# Patient Record
Sex: FEMALE | Race: WHITE | NOT HISPANIC OR LATINO | Employment: OTHER | URBAN - METROPOLITAN AREA
[De-identification: names, ages, dates, MRNs, and addresses within clinical notes are randomized per-mention and may not be internally consistent; named-entity substitution may affect disease eponyms.]

---

## 2017-05-01 ENCOUNTER — PROCEDURE VISIT (OUTPATIENT)
Dept: OBSTETRICS AND GYNECOLOGY | Facility: CLINIC | Age: 57
End: 2017-05-01

## 2017-05-01 ENCOUNTER — OFFICE VISIT (OUTPATIENT)
Dept: OBSTETRICS AND GYNECOLOGY | Facility: CLINIC | Age: 57
End: 2017-05-01

## 2017-05-01 VITALS
DIASTOLIC BLOOD PRESSURE: 68 MMHG | BODY MASS INDEX: 32.38 KG/M2 | WEIGHT: 226.2 LBS | HEIGHT: 70 IN | SYSTOLIC BLOOD PRESSURE: 140 MMHG

## 2017-05-01 DIAGNOSIS — N81.9 FEMALE GENITAL PROLAPSE, UNSPECIFIED TYPE: ICD-10-CM

## 2017-05-01 DIAGNOSIS — N95.9 MENOPAUSAL AND PERIMENOPAUSAL DISORDER: ICD-10-CM

## 2017-05-01 DIAGNOSIS — N84.1 CERVICAL POLYP: ICD-10-CM

## 2017-05-01 DIAGNOSIS — N95.0 POSTMENOPAUSAL VAGINAL BLEEDING: Primary | ICD-10-CM

## 2017-05-01 DIAGNOSIS — Z01.411 ENCOUNTER FOR GYNECOLOGICAL EXAMINATION WITH ABNORMAL FINDING: Primary | ICD-10-CM

## 2017-05-01 LAB
DEVELOPER EXPIRATION DATE: NORMAL
DEVELOPER LOT NUMBER: NORMAL
EXPIRATION DATE: NORMAL
FECAL OCCULT BLOOD SCREEN, POC: NEGATIVE
Lab: NORMAL
NEGATIVE CONTROL: NEGATIVE
POSITIVE CONTROL: POSITIVE

## 2017-05-01 PROCEDURE — 57500 BIOPSY OF CERVIX: CPT | Performed by: OBSTETRICS & GYNECOLOGY

## 2017-05-01 PROCEDURE — 82270 OCCULT BLOOD FECES: CPT | Performed by: OBSTETRICS & GYNECOLOGY

## 2017-05-01 PROCEDURE — 76830 TRANSVAGINAL US NON-OB: CPT | Performed by: OBSTETRICS & GYNECOLOGY

## 2017-05-01 PROCEDURE — 99396 PREV VISIT EST AGE 40-64: CPT | Performed by: OBSTETRICS & GYNECOLOGY

## 2017-05-02 LAB
CONV .: NORMAL
CYTOLOGIST CVX/VAG CYTO: NORMAL
CYTOLOGY CVX/VAG DOC THIN PREP: NORMAL
DX ICD CODE: NORMAL
FSH SERPL-ACNC: 12.8 MIU/ML
HIV 1 & 2 AB SER-IMP: NORMAL
OTHER STN SPEC: NORMAL
PATH REPORT.FINAL DX SPEC: NORMAL
STAT OF ADQ CVX/VAG CYTO-IMP: NORMAL

## 2017-05-03 LAB
DX ICD CODE: NORMAL
DX ICD CODE: NORMAL
PATH REPORT.FINAL DX SPEC: NORMAL
PATH REPORT.GROSS SPEC: NORMAL
PATH REPORT.SITE OF ORIGIN SPEC: NORMAL
PATHOLOGIST NAME: NORMAL
PAYMENT PROCEDURE: NORMAL

## 2017-05-08 ENCOUNTER — TELEPHONE (OUTPATIENT)
Dept: OBSTETRICS AND GYNECOLOGY | Facility: CLINIC | Age: 57
End: 2017-05-08

## 2017-05-17 ENCOUNTER — OFFICE VISIT (OUTPATIENT)
Dept: OBSTETRICS AND GYNECOLOGY | Facility: CLINIC | Age: 57
End: 2017-05-17

## 2017-05-17 VITALS
DIASTOLIC BLOOD PRESSURE: 70 MMHG | SYSTOLIC BLOOD PRESSURE: 128 MMHG | BODY MASS INDEX: 32.64 KG/M2 | WEIGHT: 228 LBS | HEIGHT: 70 IN

## 2017-05-17 DIAGNOSIS — R93.89 THICKENED ENDOMETRIUM: Primary | ICD-10-CM

## 2017-05-17 PROCEDURE — 58100 BIOPSY OF UTERUS LINING: CPT | Performed by: OBSTETRICS & GYNECOLOGY

## 2017-05-22 ENCOUNTER — TELEPHONE (OUTPATIENT)
Dept: OBSTETRICS AND GYNECOLOGY | Facility: CLINIC | Age: 57
End: 2017-05-22

## 2017-05-23 ENCOUNTER — PATIENT MESSAGE (OUTPATIENT)
Dept: OBSTETRICS AND GYNECOLOGY | Facility: CLINIC | Age: 57
End: 2017-05-23

## 2017-06-28 ENCOUNTER — OFFICE VISIT (OUTPATIENT)
Dept: OBSTETRICS AND GYNECOLOGY | Facility: CLINIC | Age: 57
End: 2017-06-28

## 2017-06-28 VITALS
WEIGHT: 224 LBS | SYSTOLIC BLOOD PRESSURE: 120 MMHG | BODY MASS INDEX: 32.07 KG/M2 | HEIGHT: 70 IN | DIASTOLIC BLOOD PRESSURE: 70 MMHG

## 2017-06-28 DIAGNOSIS — N81.2 INCOMPLETE UTEROVAGINAL PROLAPSE: Primary | ICD-10-CM

## 2017-06-28 PROCEDURE — 99213 OFFICE O/P EST LOW 20 MIN: CPT | Performed by: OBSTETRICS & GYNECOLOGY

## 2017-06-28 RX ORDER — CYCLOBENZAPRINE HCL 5 MG
5 TABLET ORAL 3 TIMES DAILY PRN
COMMUNITY
Start: 2017-05-11

## 2017-06-28 RX ORDER — SODIUM CHLORIDE 0.9 % (FLUSH) 0.9 %
1-10 SYRINGE (ML) INJECTION AS NEEDED
Status: CANCELLED | OUTPATIENT
Start: 2017-08-15

## 2017-06-28 RX ORDER — NAPROXEN 500 MG/1
500 TABLET ORAL 2 TIMES DAILY WITH MEALS
COMMUNITY
Start: 2017-06-12 | End: 2020-09-17

## 2017-06-28 NOTE — PROGRESS NOTES
"Subjective    Chief Complaint   Patient presents with   • Consult     DISCUSS HYST.       History of Present Illness    Gaby Mays is a 56 y.o. female who presents for Evaluation for hysterectomy due to symptomatic uterine descensus.  Patient had a negative endometrial biopsy and Pap this year and an ultrasound showed normal ovaries with a 1.7 cm stripe.  Patient states her descensus is getting worse but does not have stress urinary incontinence or any other GI or  issues.  Long discussion about the risks and complications of total vaginal hysterectomy including GI and  injury, colostomy, general anesthesia and patient is fine with these risks.  She understands that a vaginal hysterectomy could be converted if necessary to an abdominal hysterectomy which would require an incision.  She is interested in having a hysterectomy performed but would like to retain her ovaries if possible as we are planning.    Obstetric History:  OB History      Para Term  AB TAB SAB Ectopic Multiple Living    2 2 2                Menstrual History:     No LMP recorded.       History reviewed. No pertinent past medical history.  History reviewed. No pertinent family history.    The following portions of the patient's history were reviewed and updated as appropriate: allergies, current medications, past family history, past medical history, past social history, past surgical history and problem list.    Review of Systems   Genitourinary: Positive for dyspareunia, pelvic pain and vaginal pain.            Objective   Physical Exam   Genitourinary:   Genitourinary Comments: Patient has second to third degree descensus with the cervix at the vaginal introitus.  She has a first-degree cystocele and no significant rectocele.  Rectovaginal exam and bimanual exam negative.       /70  Ht 70\" (177.8 cm)  Wt 224 lb (102 kg)  BMI 32.14 kg/m2    Assessment/Plan   Gaby was seen today for consult.    Diagnoses and all " orders for this visit:    Incomplete uterovaginal prolapse  -     sodium chloride 0.9 % flush 1-10 mL; Infuse 1-10 mL into a venous catheter As Needed for Line Care.  -     Case Request; Standing  -     Case Request    Other orders  -     Follow Anesthesia Guidelines / Standing Orders; Future  -     Follow Anesthesia Guidelines / Standing Orders; Standing  -     Obtain Informed Consent; Standing  -     Insert Peripheral IV; Standing  -     Saline Lock & Maintain IV Access; Standing        Scheduling TV at [facility].    20 minute visit of which 15 minutes was face-to-face counseling concerning the risks and pros and cons of vaginal hysterectomy.

## 2017-07-10 ENCOUNTER — PATIENT MESSAGE (OUTPATIENT)
Dept: OBSTETRICS AND GYNECOLOGY | Facility: CLINIC | Age: 57
End: 2017-07-10

## 2017-08-01 ENCOUNTER — APPOINTMENT (OUTPATIENT)
Dept: PREADMISSION TESTING | Facility: HOSPITAL | Age: 57
End: 2017-08-01

## 2017-08-01 VITALS
TEMPERATURE: 97.4 F | SYSTOLIC BLOOD PRESSURE: 113 MMHG | OXYGEN SATURATION: 98 % | BODY MASS INDEX: 32.07 KG/M2 | DIASTOLIC BLOOD PRESSURE: 69 MMHG | RESPIRATION RATE: 20 BRPM | WEIGHT: 224 LBS | HEIGHT: 70 IN | HEART RATE: 57 BPM

## 2017-08-01 LAB
DEPRECATED RDW RBC AUTO: 47.3 FL (ref 37–54)
ERYTHROCYTE [DISTWIDTH] IN BLOOD BY AUTOMATED COUNT: 13.6 % (ref 11.7–13)
HCT VFR BLD AUTO: 36.8 % (ref 35.6–45.5)
HGB BLD-MCNC: 12.2 G/DL (ref 11.9–15.5)
MCH RBC QN AUTO: 31.6 PG (ref 26.9–32)
MCHC RBC AUTO-ENTMCNC: 33.2 G/DL (ref 32.4–36.3)
MCV RBC AUTO: 95.3 FL (ref 80.5–98.2)
PLATELET # BLD AUTO: 297 10*3/MM3 (ref 140–500)
PMV BLD AUTO: 10 FL (ref 6–12)
RBC # BLD AUTO: 3.86 10*6/MM3 (ref 3.9–5.2)
WBC NRBC COR # BLD: 6.92 10*3/MM3 (ref 4.5–10.7)

## 2017-08-01 PROCEDURE — 85027 COMPLETE CBC AUTOMATED: CPT | Performed by: OBSTETRICS & GYNECOLOGY

## 2017-08-01 PROCEDURE — 36415 COLL VENOUS BLD VENIPUNCTURE: CPT

## 2017-08-01 RX ORDER — CHLORAL HYDRATE 500 MG
1000 CAPSULE ORAL
COMMUNITY

## 2017-08-01 NOTE — DISCHARGE INSTRUCTIONS
Take the following medications the morning of surgery with a small sip of water:  NONE      General Instructions:  • Do not eat solid food after midnight the night before surgery.  • You may drink clear liquids day of surgery but must stop at least one hour before your hospital arrival time.  • It is beneficial for you to have a clear drink that contains carbohydrates the day of surgery.  We suggest a 20 ounce bottle of Gatorade or Powerade for non-diabetic patients or a 20 ounce bottle of G2 or Powerade Zero for diabetic patients. (Pediatric patients, are not advised to drink a 20 ounce carbohydrate drink)    Clear liquids are liquids you can see through.  Nothing red in color.     Plain water                               Sports drinks  Sodas                                   Gelatin (Jell-O)  Fruit juices without pulp such as white grape juice and apple juice  Popsicles that contain no fruit or yogurt  Tea or coffee (no cream or milk added)  Gatorade / Powerade  G2 / Powerade Zero    • Infants may have breast milk up to four hours before surgery.  • Infants drinking formula may drink formula up to six hours before surgery.   • Patients who avoid smoking, chewing tobacco and alcohol for 4 weeks prior to surgery have a reduced risk of post-operative complications.  Quit smoking as many days before surgery as you can.  • Do not smoke, use chewing tobacco or drink alcohol the day of surgery.   • If applicable bring your C-PAP/ BI-PAP machine.  • Bring any papers given to you in the doctor’s office.  • Wear clean comfortable clothes and socks.  • Do not wear contact lenses or make-up.  Bring a case for your glasses.   • Bring crutches or walker if applicable.  • Leave all other valuables and jewelry at home.  • The Pre-Admission Testing nurse will instruct you to bring medications if unable to obtain an accurate list in Pre-Admission Testing.        If you were given a blood bank ID arm band remember to bring it with  you the day of surgery.    Preventing a Surgical Site Infection:  • For 2 to 3 days before surgery, avoid shaving with a razor because the razor can irritate skin and make it easier to develop an infection.  • The night prior to surgery sleep in a clean bed with clean clothing.  Do not allow pets to sleep with you.  • Shower on the morning of surgery using a fresh bar of anti-bacterial soap (such as Dial) and clean washcloth.  Dry with a clean towel and dress in clean clothing.  • Ask your surgeon if you will be receiving antibiotics prior to surgery.  • Make sure you, your family, and all healthcare providers clean their hands with soap and water or an alcohol based hand  before caring for you or your wound.    Day of surgery: 8/15/2017 ARRIVAL TIME 10:00 AM  Upon arrival, a Pre-op nurse and Anesthesiologist will review your health history, obtain vital signs, and answer questions you may have.  The only belongings needed at this time will be your home medications and if applicable your C-PAP/BI-PAP machine.  If you are staying overnight your family can leave the rest of your belongings in the car and bring them to your room later.  A Pre-op nurse will start an IV and you may receive medication in preparation for surgery, including something to help you relax.  Your family will be able to see you in the Pre-op area.  While you are in surgery your family should notify the waiting room  if they leave the waiting room area and provide a contact phone number.    Please be aware that surgery does come with discomfort.  We want to make every effort to control your discomfort so please discuss any uncontrolled symptoms with your nurse.   Your doctor will most likely have prescribed pain medications.      If you are going home after surgery you will receive individualized written care instructions before being discharged.  A responsible adult must drive you to and from the hospital on the day of your  surgery and stay with you for 24 hours.    If you are staying overnight following surgery, you will be transported to your hospital room following the recovery period.  [facility] has all private rooms.    If you have any questions please call Pre-Admission Testing at [phone].  Deductibles and co-payments are collected on the day of service. Please be prepared to pay the required co-pay, deductible or deposit on the day of service as defined by your plan.

## 2017-08-15 ENCOUNTER — ANESTHESIA EVENT (OUTPATIENT)
Dept: PERIOP | Facility: HOSPITAL | Age: 57
End: 2017-08-15

## 2017-08-15 ENCOUNTER — HOSPITAL ENCOUNTER (OUTPATIENT)
Facility: HOSPITAL | Age: 57
Discharge: HOME | End: 2017-08-16
Attending: OBSTETRICS & GYNECOLOGY | Admitting: OBSTETRICS & GYNECOLOGY

## 2017-08-15 ENCOUNTER — ANESTHESIA (OUTPATIENT)
Dept: PERIOP | Facility: HOSPITAL | Age: 57
End: 2017-08-15

## 2017-08-15 DIAGNOSIS — N81.2 INCOMPLETE UTEROVAGINAL PROLAPSE: ICD-10-CM

## 2017-08-15 LAB
B-HCG UR QL: NEGATIVE
DEPRECATED RDW RBC AUTO: 48.2 FL (ref 37–54)
ERYTHROCYTE [DISTWIDTH] IN BLOOD BY AUTOMATED COUNT: 13.5 % (ref 11.7–13)
HCT VFR BLD AUTO: 37 % (ref 35.6–45.5)
HGB BLD-MCNC: 11.7 G/DL (ref 11.9–15.5)
INTERNAL NEGATIVE CONTROL: NEGATIVE
INTERNAL POSITIVE CONTROL: POSITIVE
Lab: NORMAL
MCH RBC QN AUTO: 30.4 PG (ref 26.9–32)
MCHC RBC AUTO-ENTMCNC: 31.6 G/DL (ref 32.4–36.3)
MCV RBC AUTO: 96.1 FL (ref 80.5–98.2)
PLATELET # BLD AUTO: 259 10*3/MM3 (ref 140–500)
PMV BLD AUTO: 10.3 FL (ref 6–12)
RBC # BLD AUTO: 3.85 10*6/MM3 (ref 3.9–5.2)
WBC NRBC COR # BLD: 12.61 10*3/MM3 (ref 4.5–10.7)

## 2017-08-15 PROCEDURE — 88307 TISSUE EXAM BY PATHOLOGIST: CPT | Performed by: OBSTETRICS & GYNECOLOGY

## 2017-08-15 PROCEDURE — 94799 UNLISTED PULMONARY SVC/PX: CPT

## 2017-08-15 PROCEDURE — 25010000002 FENTANYL CITRATE (PF) 100 MCG/2ML SOLUTION: Performed by: NURSE ANESTHETIST, CERTIFIED REGISTERED

## 2017-08-15 PROCEDURE — 25010000002 MORPHINE PER 10 MG

## 2017-08-15 PROCEDURE — 25010000002 PROPOFOL 10 MG/ML EMULSION: Performed by: NURSE ANESTHETIST, CERTIFIED REGISTERED

## 2017-08-15 PROCEDURE — 58260 VAGINAL HYSTERECTOMY: CPT | Performed by: OBSTETRICS & GYNECOLOGY

## 2017-08-15 PROCEDURE — 25010000002 DEXAMETHASONE PER 1 MG: Performed by: NURSE ANESTHETIST, CERTIFIED REGISTERED

## 2017-08-15 PROCEDURE — 25010000003 CEFAZOLIN IN DEXTROSE 2-4 GM/100ML-% SOLUTION: Performed by: OBSTETRICS & GYNECOLOGY

## 2017-08-15 PROCEDURE — 25010000002 HYDROMORPHONE PER 4 MG: Performed by: NURSE ANESTHETIST, CERTIFIED REGISTERED

## 2017-08-15 PROCEDURE — 85027 COMPLETE CBC AUTOMATED: CPT | Performed by: OBSTETRICS & GYNECOLOGY

## 2017-08-15 PROCEDURE — 25010000002 MIDAZOLAM PER 1 MG: Performed by: ANESTHESIOLOGY

## 2017-08-15 PROCEDURE — 25010000002 KETOROLAC TROMETHAMINE PER 15 MG: Performed by: NURSE ANESTHETIST, CERTIFIED REGISTERED

## 2017-08-15 PROCEDURE — 25010000002 ONDANSETRON PER 1 MG: Performed by: NURSE ANESTHETIST, CERTIFIED REGISTERED

## 2017-08-15 RX ORDER — PROPOFOL 10 MG/ML
VIAL (ML) INTRAVENOUS AS NEEDED
Status: DISCONTINUED | OUTPATIENT
Start: 2017-08-15 | End: 2017-08-15 | Stop reason: SURG

## 2017-08-15 RX ORDER — HYDROMORPHONE HCL 110MG/55ML
PATIENT CONTROLLED ANALGESIA SYRINGE INTRAVENOUS AS NEEDED
Status: DISCONTINUED | OUTPATIENT
Start: 2017-08-15 | End: 2017-08-15 | Stop reason: SURG

## 2017-08-15 RX ORDER — MAGNESIUM HYDROXIDE 1200 MG/15ML
LIQUID ORAL AS NEEDED
Status: DISCONTINUED | OUTPATIENT
Start: 2017-08-15 | End: 2017-08-15 | Stop reason: HOSPADM

## 2017-08-15 RX ORDER — FAMOTIDINE 10 MG/ML
20 INJECTION, SOLUTION INTRAVENOUS ONCE
Status: COMPLETED | OUTPATIENT
Start: 2017-08-15 | End: 2017-08-15

## 2017-08-15 RX ORDER — SODIUM CHLORIDE 0.9 % (FLUSH) 0.9 %
1-10 SYRINGE (ML) INJECTION AS NEEDED
Status: DISCONTINUED | OUTPATIENT
Start: 2017-08-15 | End: 2017-08-15 | Stop reason: HOSPADM

## 2017-08-15 RX ORDER — FENTANYL CITRATE 50 UG/ML
50 INJECTION, SOLUTION INTRAMUSCULAR; INTRAVENOUS
Status: DISCONTINUED | OUTPATIENT
Start: 2017-08-15 | End: 2017-08-15 | Stop reason: HOSPADM

## 2017-08-15 RX ORDER — ONDANSETRON 2 MG/ML
INJECTION INTRAMUSCULAR; INTRAVENOUS AS NEEDED
Status: DISCONTINUED | OUTPATIENT
Start: 2017-08-15 | End: 2017-08-15 | Stop reason: SURG

## 2017-08-15 RX ORDER — OXYCODONE HYDROCHLORIDE AND ACETAMINOPHEN 5; 325 MG/1; MG/1
2 TABLET ORAL EVERY 4 HOURS PRN
Status: DISCONTINUED | OUTPATIENT
Start: 2017-08-15 | End: 2017-08-16 | Stop reason: HOSPADM

## 2017-08-15 RX ORDER — PROMETHAZINE HYDROCHLORIDE 25 MG/1
25 TABLET ORAL ONCE AS NEEDED
Status: DISCONTINUED | OUTPATIENT
Start: 2017-08-15 | End: 2017-08-15 | Stop reason: HOSPADM

## 2017-08-15 RX ORDER — CEFAZOLIN SODIUM 2 G/100ML
INJECTION, SOLUTION INTRAVENOUS
Status: DISPENSED
Start: 2017-08-15 | End: 2017-08-16

## 2017-08-15 RX ORDER — MIDAZOLAM HYDROCHLORIDE 1 MG/ML
1 INJECTION INTRAMUSCULAR; INTRAVENOUS
Status: DISCONTINUED | OUTPATIENT
Start: 2017-08-15 | End: 2017-08-15 | Stop reason: HOSPADM

## 2017-08-15 RX ORDER — EPHEDRINE SULFATE 50 MG/ML
5 INJECTION, SOLUTION INTRAVENOUS ONCE AS NEEDED
Status: DISCONTINUED | OUTPATIENT
Start: 2017-08-15 | End: 2017-08-15 | Stop reason: HOSPADM

## 2017-08-15 RX ORDER — SODIUM CHLORIDE, SODIUM LACTATE, POTASSIUM CHLORIDE, CALCIUM CHLORIDE 600; 310; 30; 20 MG/100ML; MG/100ML; MG/100ML; MG/100ML
9 INJECTION, SOLUTION INTRAVENOUS CONTINUOUS
Status: DISCONTINUED | OUTPATIENT
Start: 2017-08-15 | End: 2017-08-15

## 2017-08-15 RX ORDER — BISACODYL 10 MG
10 SUPPOSITORY, RECTAL RECTAL DAILY PRN
Status: DISCONTINUED | OUTPATIENT
Start: 2017-08-15 | End: 2017-08-16 | Stop reason: HOSPADM

## 2017-08-15 RX ORDER — PROMETHAZINE HYDROCHLORIDE 25 MG/1
25 SUPPOSITORY RECTAL ONCE AS NEEDED
Status: DISCONTINUED | OUTPATIENT
Start: 2017-08-15 | End: 2017-08-15 | Stop reason: HOSPADM

## 2017-08-15 RX ORDER — MORPHINE SULFATE IN 0.9 % NACL 50 MG/50ML
PATIENT CONTROLLED ANALGESIA SYRINGE INTRAVENOUS CONTINUOUS
Status: DISCONTINUED | OUTPATIENT
Start: 2017-08-15 | End: 2017-08-16 | Stop reason: HOSPADM

## 2017-08-15 RX ORDER — ACETAMINOPHEN 325 MG/1
650 TABLET ORAL EVERY 6 HOURS PRN
COMMUNITY
End: 2020-09-17

## 2017-08-15 RX ORDER — ONDANSETRON 4 MG/1
4 TABLET, FILM COATED ORAL EVERY 6 HOURS PRN
Status: DISCONTINUED | OUTPATIENT
Start: 2017-08-15 | End: 2017-08-16 | Stop reason: HOSPADM

## 2017-08-15 RX ORDER — IBUPROFEN 400 MG/1
400 TABLET ORAL EVERY 6 HOURS PRN
Status: DISCONTINUED | OUTPATIENT
Start: 2017-08-15 | End: 2017-08-16 | Stop reason: HOSPADM

## 2017-08-15 RX ORDER — HYDROMORPHONE HYDROCHLORIDE 1 MG/ML
0.5 INJECTION, SOLUTION INTRAMUSCULAR; INTRAVENOUS; SUBCUTANEOUS
Status: DISCONTINUED | OUTPATIENT
Start: 2017-08-15 | End: 2017-08-15 | Stop reason: HOSPADM

## 2017-08-15 RX ORDER — KETOROLAC TROMETHAMINE 30 MG/ML
INJECTION, SOLUTION INTRAMUSCULAR; INTRAVENOUS AS NEEDED
Status: DISCONTINUED | OUTPATIENT
Start: 2017-08-15 | End: 2017-08-15 | Stop reason: SURG

## 2017-08-15 RX ORDER — LABETALOL HYDROCHLORIDE 5 MG/ML
5 INJECTION, SOLUTION INTRAVENOUS
Status: DISCONTINUED | OUTPATIENT
Start: 2017-08-15 | End: 2017-08-15 | Stop reason: HOSPADM

## 2017-08-15 RX ORDER — ONDANSETRON 2 MG/ML
4 INJECTION INTRAMUSCULAR; INTRAVENOUS ONCE AS NEEDED
Status: DISCONTINUED | OUTPATIENT
Start: 2017-08-15 | End: 2017-08-15 | Stop reason: HOSPADM

## 2017-08-15 RX ORDER — ONDANSETRON 2 MG/ML
4 INJECTION INTRAMUSCULAR; INTRAVENOUS EVERY 6 HOURS PRN
Status: DISCONTINUED | OUTPATIENT
Start: 2017-08-15 | End: 2017-08-16 | Stop reason: HOSPADM

## 2017-08-15 RX ORDER — HYDRALAZINE HYDROCHLORIDE 20 MG/ML
5 INJECTION INTRAMUSCULAR; INTRAVENOUS
Status: DISCONTINUED | OUTPATIENT
Start: 2017-08-15 | End: 2017-08-15 | Stop reason: HOSPADM

## 2017-08-15 RX ORDER — NALOXONE HCL 0.4 MG/ML
0.1 VIAL (ML) INJECTION
Status: DISCONTINUED | OUTPATIENT
Start: 2017-08-15 | End: 2017-08-16 | Stop reason: HOSPADM

## 2017-08-15 RX ORDER — MORPHINE SULFATE IN 0.9 % NACL 50 MG/50ML
PATIENT CONTROLLED ANALGESIA SYRINGE INTRAVENOUS
Status: COMPLETED
Start: 2017-08-15 | End: 2017-08-15

## 2017-08-15 RX ORDER — PROMETHAZINE HYDROCHLORIDE 25 MG/ML
6.25 INJECTION, SOLUTION INTRAMUSCULAR; INTRAVENOUS ONCE AS NEEDED
Status: DISCONTINUED | OUTPATIENT
Start: 2017-08-15 | End: 2017-08-15 | Stop reason: HOSPADM

## 2017-08-15 RX ORDER — DIPHENHYDRAMINE HYDROCHLORIDE 50 MG/ML
12.5 INJECTION INTRAMUSCULAR; INTRAVENOUS
Status: DISCONTINUED | OUTPATIENT
Start: 2017-08-15 | End: 2017-08-15 | Stop reason: HOSPADM

## 2017-08-15 RX ORDER — ONDANSETRON 4 MG/1
4 TABLET, ORALLY DISINTEGRATING ORAL EVERY 6 HOURS PRN
Status: DISCONTINUED | OUTPATIENT
Start: 2017-08-15 | End: 2017-08-16 | Stop reason: HOSPADM

## 2017-08-15 RX ORDER — CEFAZOLIN SODIUM 2 G/100ML
2 INJECTION, SOLUTION INTRAVENOUS ONCE
Status: COMPLETED | OUTPATIENT
Start: 2017-08-15 | End: 2017-08-15

## 2017-08-15 RX ORDER — LIDOCAINE HYDROCHLORIDE 20 MG/ML
INJECTION, SOLUTION INFILTRATION; PERINEURAL AS NEEDED
Status: DISCONTINUED | OUTPATIENT
Start: 2017-08-15 | End: 2017-08-15 | Stop reason: SURG

## 2017-08-15 RX ORDER — FENTANYL CITRATE 50 UG/ML
INJECTION, SOLUTION INTRAMUSCULAR; INTRAVENOUS AS NEEDED
Status: DISCONTINUED | OUTPATIENT
Start: 2017-08-15 | End: 2017-08-15 | Stop reason: SURG

## 2017-08-15 RX ORDER — NALOXONE HCL 0.4 MG/ML
0.2 VIAL (ML) INJECTION AS NEEDED
Status: DISCONTINUED | OUTPATIENT
Start: 2017-08-15 | End: 2017-08-15 | Stop reason: HOSPADM

## 2017-08-15 RX ORDER — LIDOCAINE HYDROCHLORIDE AND EPINEPHRINE 10; 10 MG/ML; UG/ML
INJECTION, SOLUTION INFILTRATION; PERINEURAL AS NEEDED
Status: DISCONTINUED | OUTPATIENT
Start: 2017-08-15 | End: 2017-08-15 | Stop reason: HOSPADM

## 2017-08-15 RX ORDER — FLUMAZENIL 0.1 MG/ML
0.2 INJECTION INTRAVENOUS AS NEEDED
Status: DISCONTINUED | OUTPATIENT
Start: 2017-08-15 | End: 2017-08-15 | Stop reason: HOSPADM

## 2017-08-15 RX ORDER — MIDAZOLAM HYDROCHLORIDE 1 MG/ML
2 INJECTION INTRAMUSCULAR; INTRAVENOUS
Status: DISCONTINUED | OUTPATIENT
Start: 2017-08-15 | End: 2017-08-15 | Stop reason: HOSPADM

## 2017-08-15 RX ORDER — DOCUSATE SODIUM 100 MG/1
100 CAPSULE, LIQUID FILLED ORAL 2 TIMES DAILY PRN
Status: DISCONTINUED | OUTPATIENT
Start: 2017-08-15 | End: 2017-08-16 | Stop reason: HOSPADM

## 2017-08-15 RX ORDER — DEXAMETHASONE SODIUM PHOSPHATE 10 MG/ML
INJECTION INTRAMUSCULAR; INTRAVENOUS AS NEEDED
Status: DISCONTINUED | OUTPATIENT
Start: 2017-08-15 | End: 2017-08-15 | Stop reason: SURG

## 2017-08-15 RX ORDER — PROMETHAZINE HYDROCHLORIDE 25 MG/ML
12.5 INJECTION, SOLUTION INTRAMUSCULAR; INTRAVENOUS ONCE AS NEEDED
Status: DISCONTINUED | OUTPATIENT
Start: 2017-08-15 | End: 2017-08-15 | Stop reason: HOSPADM

## 2017-08-15 RX ORDER — DEXTROSE AND SODIUM CHLORIDE 5; .45 G/100ML; G/100ML
100 INJECTION, SOLUTION INTRAVENOUS CONTINUOUS
Status: DISCONTINUED | OUTPATIENT
Start: 2017-08-15 | End: 2017-08-16 | Stop reason: HOSPADM

## 2017-08-15 RX ADMIN — FENTANYL CITRATE 50 MCG: 50 INJECTION INTRAMUSCULAR; INTRAVENOUS at 13:11

## 2017-08-15 RX ADMIN — FENTANYL CITRATE 25 MCG: 50 INJECTION INTRAMUSCULAR; INTRAVENOUS at 13:20

## 2017-08-15 RX ADMIN — HYDROMORPHONE HYDROCHLORIDE 0.5 MG: 2 INJECTION, SOLUTION INTRAMUSCULAR; INTRAVENOUS; SUBCUTANEOUS at 14:04

## 2017-08-15 RX ADMIN — FENTANYL CITRATE 25 MCG: 50 INJECTION INTRAMUSCULAR; INTRAVENOUS at 13:25

## 2017-08-15 RX ADMIN — SODIUM CHLORIDE, POTASSIUM CHLORIDE, SODIUM LACTATE AND CALCIUM CHLORIDE 9 ML/HR: 600; 310; 30; 20 INJECTION, SOLUTION INTRAVENOUS at 11:52

## 2017-08-15 RX ADMIN — PROPOFOL 200 MG: 10 INJECTION, EMULSION INTRAVENOUS at 12:55

## 2017-08-15 RX ADMIN — SODIUM CHLORIDE, POTASSIUM CHLORIDE, SODIUM LACTATE AND CALCIUM CHLORIDE: 600; 310; 30; 20 INJECTION, SOLUTION INTRAVENOUS at 14:05

## 2017-08-15 RX ADMIN — FAMOTIDINE 20 MG: 10 INJECTION, SOLUTION INTRAVENOUS at 11:52

## 2017-08-15 RX ADMIN — LIDOCAINE HYDROCHLORIDE 80 MG: 20 INJECTION, SOLUTION INFILTRATION; PERINEURAL at 12:55

## 2017-08-15 RX ADMIN — DEXTROSE AND SODIUM CHLORIDE 100 ML/HR: 5; 450 INJECTION, SOLUTION INTRAVENOUS at 17:38

## 2017-08-15 RX ADMIN — IBUPROFEN 400 MG: 400 TABLET ORAL at 22:44

## 2017-08-15 RX ADMIN — FENTANYL CITRATE 100 MCG: 50 INJECTION INTRAMUSCULAR; INTRAVENOUS at 12:55

## 2017-08-15 RX ADMIN — Medication: at 14:54

## 2017-08-15 RX ADMIN — OXYCODONE HYDROCHLORIDE AND ACETAMINOPHEN 2 TABLET: 5; 325 TABLET ORAL at 20:27

## 2017-08-15 RX ADMIN — CEFAZOLIN SODIUM 2 G: 2 INJECTION, SOLUTION INTRAVENOUS at 13:00

## 2017-08-15 RX ADMIN — ONDANSETRON 4 MG: 2 INJECTION INTRAMUSCULAR; INTRAVENOUS at 14:02

## 2017-08-15 RX ADMIN — KETOROLAC TROMETHAMINE 30 MG: 30 INJECTION, SOLUTION INTRAMUSCULAR; INTRAVENOUS at 14:10

## 2017-08-15 RX ADMIN — HYDROMORPHONE HYDROCHLORIDE 0.5 MG: 2 INJECTION, SOLUTION INTRAMUSCULAR; INTRAVENOUS; SUBCUTANEOUS at 13:42

## 2017-08-15 RX ADMIN — MIDAZOLAM 2 MG: 1 INJECTION INTRAMUSCULAR; INTRAVENOUS at 11:53

## 2017-08-15 RX ADMIN — DEXAMETHASONE SODIUM PHOSPHATE 8 MG: 10 INJECTION INTRAMUSCULAR; INTRAVENOUS at 13:10

## 2017-08-15 RX ADMIN — DOCUSATE SODIUM 100 MG: 100 CAPSULE, LIQUID FILLED ORAL at 20:27

## 2017-08-15 NOTE — PLAN OF CARE
Problem: Patient Care Overview (Adult)  Goal: Plan of Care Review  Outcome: Ongoing (interventions implemented as appropriate)    08/15/17 1044   Coping/Psychosocial Response Interventions   Plan Of Care Reviewed With patient   Patient Care Overview   Progress no change       Goal: Discharge Needs Assessment  Outcome: Ongoing (interventions implemented as appropriate)    08/15/17 1044   Discharge Needs Assessment   Concerns To Be Addressed no discharge needs identified;denies needs/concerns at this time   Equipment Needed After Discharge none   Self-Care   Equipment Currently Used at Home none         Problem: Perioperative Period (Adult)  Goal: Signs and Symptoms of Listed Potential Problems Will be Absent or Manageable (Perioperative Period)  Outcome: Ongoing (interventions implemented as appropriate)    08/15/17 1044   Perioperative Period   Problems Assessed (Perioperative Period) all   Problems Present (Perioperative Period) pain

## 2017-08-15 NOTE — OP NOTE
Operative Note      Gaby Mays  57 y.o.  1960  female  [id number]      8/15/2017    Surgeon(s) and Role:     * Jameel Munguia MD - Primary     Pre-operative Diagnosis Code: Pre-Op Diagnosis Codes:     * Incomplete uterovaginal prolapse [N81.2]    Pre-operative Diagnosis Free Text:  Incomplete uterovaginal prolapse [N81.2]    Post-operative Diagnosis: same  Surgeon= Ezra                   Assist= Radha  Findings/Complications:  2-3 degree prolapse    Description of procedure:  Procedure(s) and Anesthesia Type:     * VAGINAL HYSTERECTOMY - General     Anesthesia= GETA  Specimens:    [unfilled]    Estimated Blood Loss: 100cc  After receiving 2 g of Kefzol IV prophylaxis, patient is transferred to the operating room and placed in dorsal lithotomy position after general endotracheal anesthesia was administered.  Examination under anesthesia revealed a 2-3° uterine prolapse.  Weighted speculum was placed in the vagina and a Etienne tenaculum on the posterior lip of the cervix.  1% lidocaine with epinephrine was injected circumferentially around the cervix.  The vaginal mucosa was circumscribed posteriorly and a figure-of-eight placed on the vaginal cuff for hemostasis although we were not able to easily enter the peritoneal cavity.  Curved Heaneys were then used on the cardinal ligaments progressing on each side with each pedicle being divided and suture ligated with figure-of-eight 0 Vicryl suture.  Eventually the anterior vaginal mucosa was circumscribed and the vaginal mucosa pushed back and the peritoneum entered uneventfully.  Straight Ballantines were used on each side of the uterus with successive bites being divided with scissors and sutured with a interrupted figure-of-eight 0 Vicryl sutures.  Uterine vessels on both sides were then clamped divided and suture ligated with 0 Vicryl.  Eventually the superior pedicles were clamped across with straight clamps and both pedicles doubly suture ligated with 0  Vicryl.  A few figure-of-eight sutures were then placed on the posterior vaginal cuff for hemostasis.  Since the anterior peritoneum was pushed way back, it was not closed with a pursestring suture as usually done.  Figure-of-eight 0 Vicryl sutures were placed at the vaginal angles for good hemostasis.  The cuff was then closed with successive figure-of-eight 0 Vicryl sutures with excellent hemostasis and 100 cc total estimated blood loss.  A vaginal pack was in place.  A Francis catheter was placed in the urine was totally clear.  The rectum was also checked and noted to be uninvolved.  All counts were correct ×3 with no complications the patient was transferred to the recovery room in satisfactory condition.       Jameel Munguia MD  8/15/2017

## 2017-08-15 NOTE — PLAN OF CARE
Problem: Perioperative Period (Adult)  Goal: Signs and Symptoms of Listed Potential Problems Will be Absent or Manageable (Perioperative Period)  Outcome: Ongoing (interventions implemented as appropriate)    08/15/17 1531   Perioperative Period   Problems Assessed (Perioperative Period) pain;wound complications;embolism;hypothermia;hemorrhage;hypoxia/hypoxemia   Problems Present (Perioperative Period) none

## 2017-08-15 NOTE — ANESTHESIA PROCEDURE NOTES
Airway  Urgency: elective    Airway not difficult    General Information and Staff    Patient location during procedure: OR  CRNA: CAROL RUTLEDGE    Indications and Patient Condition  Indications for airway management: airway protection    Preoxygenated: yes  MILS not maintained throughout  Mask difficulty assessment: 1 - vent by mask    Final Airway Details  Final airway type: supraglottic airway      Successful airway: classic  Size 4    Number of attempts at approach: 1    Additional Comments  Inflated to seal.

## 2017-08-15 NOTE — ANESTHESIA PREPROCEDURE EVALUATION
Anesthesia Evaluation     Patient summary reviewed and Nursing notes reviewed   NPO Solid Status: > 8 hours  NPO Liquid Status: > 2 hours     Airway   Mallampati: II  no difficulty expected  Dental - normal exam     Pulmonary - negative pulmonary ROS    breath sounds clear to auscultation  Cardiovascular - negative cardio ROS    Rhythm: regular  Rate: normal        Neuro/Psych- negative ROS  GI/Hepatic/Renal/Endo - negative ROS     Musculoskeletal     Abdominal    Substance History - negative use     OB/GYN negative ob/gyn ROS         Other   (+) arthritis                                     Anesthesia Plan    ASA 2     general     intravenous induction   Anesthetic plan and risks discussed with patient.    Plan discussed with CRNA.

## 2017-08-15 NOTE — PLAN OF CARE
Problem: Patient Care Overview (Adult)  Goal: Plan of Care Review  Outcome: Ongoing (interventions implemented as appropriate)    08/15/17 2156   Coping/Psychosocial Response Interventions   Plan Of Care Reviewed With patient   Patient Care Overview   Progress improving   Outcome Evaluation   Outcome Summary/Follow up Plan pt denies pain, PCA set up from PACU, small amount of vag bleeding, way in place, IVF infusing, VSS       Goal: Adult Individualization and Mutuality  Outcome: Ongoing (interventions implemented as appropriate)  Goal: Discharge Needs Assessment  Outcome: Ongoing (interventions implemented as appropriate)    Problem: Perioperative Period (Adult)  Goal: Signs and Symptoms of Listed Potential Problems Will be Absent or Manageable (Perioperative Period)  Outcome: Ongoing (interventions implemented as appropriate)

## 2017-08-15 NOTE — ANESTHESIA POSTPROCEDURE EVALUATION
Patient: Gaby Mays    Procedure Summary     Date Anesthesia Start Anesthesia Stop Room / Location    08/15/17 [id number]  [facility]  [facility]       Procedure Diagnosis Surgeon Provider    VAGINAL HYSTERECTOMY (N/A Uterus) Incomplete uterovaginal prolapse  (Incomplete uterovaginal prolapse [N81.2]) MD Fitz Mora MD          Anesthesia Type: general  Last vitals  BP   124/80 (08/15/17 1500)    Temp   36.7 °C (98.1 °F) (08/15/17 1427)    Pulse   71 (08/15/17 1500)   Resp   16 (08/15/17 1500)    SpO2   95 % (08/15/17 1500)      Post Anesthesia Care and Evaluation    Patient location during evaluation: PACU  Patient participation: complete - patient participated  Level of consciousness: awake and alert  Pain management: adequate  Airway patency: patent  Anesthetic complications: No anesthetic complications    Cardiovascular status: acceptable  Respiratory status: acceptable  Hydration status: acceptable    Comments: Patient seen in PACU previously and ready for DC

## 2017-08-15 NOTE — H&P
H&P Note    Patient Identification:  Name: Gaby Mays  Age: 57 y.o.  Sex: female  :  1960  MRN: [id number]                       Chief Complaint:  Symptomatic uterine descensus    History of Present Illness:   56 yo WF p/w symptomatic 2-3 degree uterine descensus without ILEANA. Pt having a lot of pelvic pressure and pain from prolapse. Pt had a neg pap and endometrial biopsy this year and US showed normal ovaries.  Pt had a tubal ligation.     [facility]  [facility]  Past Medical History:  Past Medical History:   Diagnosis Date   • Arthritis    • Prolapsed uterus      Past Surgical History:  Past Surgical History:   Procedure Laterality Date   • HEMORRHOIDECTOMY     • TUBAL ABDOMINAL LIGATION        Home Meds:  Prescriptions Prior to Admission   Medication Sig Dispense Refill Last Dose   • Calcium Carbonate-Vitamin D (CALCIUM 600+D PO) Take 1 tablet by mouth Every Morning. HOLD PRIOR TO SURG      • cyclobenzaprine (FLEXERIL) 5 MG tablet Take 5 mg by mouth 3 (Three) Times a Day As Needed.      • MILK THISTLE PO Take 1 capsule by mouth Every Morning. HOLD PRIOR TO SURG      • naproxen (NAPROSYN) 500 MG tablet Take 500 mg by mouth 2 (Two) Times a Day With Meals. HOLD PRIOR TO SURG      • Omega-3 Fatty Acids (FISH OIL) 1000 MG capsule capsule Take 1,000 mg by mouth Daily With Breakfast. HOLD PRIOR TO SURG        Current Meds:   [unfilled]  Allergies:  No Known Allergies  Immunizations:    There is no immunization history on file for this patient.  Social History:   Social History   Substance Use Topics   • Smoking status: Never Smoker   • Smokeless tobacco: Not on file   • Alcohol use No      Family History:  No family history on file.     Review of Systems  Pertinent items are noted in HPI.    Objective:  tMax 24 hrs: No data recorded.    Vitals Ranges:      Intake and Output Last 3 Shifts:        Exam:     General Appearance:    Alert, cooperative, no distress, appears stated age   Head:    Normocephalic,  without obvious abnormality, atraumatic   Back:     Symmetric, no curvature, ROM normal, no CVA tenderness   Lungs:     Clear to auscultation bilaterally, respirations unlabored   Chest Wall:    No tenderness or deformity    Heart:    Regular rate and rhythm, S1 and S2 normal, no murmur, rub   or gallop   Breast Exam:    No tenderness, masses, or nipple abnormality   Abdomen:     Soft, non-tender, bowel sounds active all four quadrants,     no masses, no organomegaly   Genitalia:    Normal female without lesion, discharge or tenderness. Cx neg.uterus NSSc 2-3 degree descensus, 1st degree cystocele without rectocele.    Rectal:    Normal tone, no masses or tenderness; guaiac negative stool   Extremities:   Extremities normal, atraumatic, no cyanosis or edema   Skin:   Skin color, texture, turgor normal, no rashes or lesions           Data Review:     Lab Results (last 24 hours)     ** No results found for the last 24 hours. **        Assessment:  Active Problems:    * No active hospital problems. *      1. Symptomatic uterine descensus      Plan:  1. Planned total vaginal hysterectomy.  Abdominal approach if determined necessary during surgery.     Jameel Munguia MD  8/15/2017

## 2017-08-16 VITALS
DIASTOLIC BLOOD PRESSURE: 62 MMHG | WEIGHT: 224 LBS | BODY MASS INDEX: 32.07 KG/M2 | HEART RATE: 57 BPM | TEMPERATURE: 96.9 F | RESPIRATION RATE: 16 BRPM | SYSTOLIC BLOOD PRESSURE: 96 MMHG | HEIGHT: 70 IN | OXYGEN SATURATION: 97 %

## 2017-08-16 LAB
BASOPHILS # BLD AUTO: 0.01 10*3/MM3 (ref 0–0.2)
BASOPHILS NFR BLD AUTO: 0.1 % (ref 0–1.5)
CYTO UR: NORMAL
DEPRECATED RDW RBC AUTO: 47.8 FL (ref 37–54)
EOSINOPHIL # BLD AUTO: 0 10*3/MM3 (ref 0–0.7)
EOSINOPHIL NFR BLD AUTO: 0 % (ref 0.3–6.2)
ERYTHROCYTE [DISTWIDTH] IN BLOOD BY AUTOMATED COUNT: 13.6 % (ref 11.7–13)
HCT VFR BLD AUTO: 35.7 % (ref 35.6–45.5)
HGB BLD-MCNC: 11.2 G/DL (ref 11.9–15.5)
IMM GRANULOCYTES # BLD: 0.03 10*3/MM3 (ref 0–0.03)
IMM GRANULOCYTES NFR BLD: 0.2 % (ref 0–0.5)
LAB AP CASE REPORT: NORMAL
LYMPHOCYTES # BLD AUTO: 1.26 10*3/MM3 (ref 0.9–4.8)
LYMPHOCYTES NFR BLD AUTO: 10.3 % (ref 19.6–45.3)
Lab: NORMAL
MCH RBC QN AUTO: 30.3 PG (ref 26.9–32)
MCHC RBC AUTO-ENTMCNC: 31.4 G/DL (ref 32.4–36.3)
MCV RBC AUTO: 96.5 FL (ref 80.5–98.2)
MONOCYTES # BLD AUTO: 1.15 10*3/MM3 (ref 0.2–1.2)
MONOCYTES NFR BLD AUTO: 9.4 % (ref 5–12)
NEUTROPHILS # BLD AUTO: 9.83 10*3/MM3 (ref 1.9–8.1)
NEUTROPHILS NFR BLD AUTO: 80 % (ref 42.7–76)
PATH REPORT.FINAL DX SPEC: NORMAL
PATH REPORT.GROSS SPEC: NORMAL
PLATELET # BLD AUTO: 278 10*3/MM3 (ref 140–500)
PMV BLD AUTO: 10.2 FL (ref 6–12)
RBC # BLD AUTO: 3.7 10*6/MM3 (ref 3.9–5.2)
WBC NRBC COR # BLD: 12.28 10*3/MM3 (ref 4.5–10.7)

## 2017-08-16 PROCEDURE — 85025 COMPLETE CBC W/AUTO DIFF WBC: CPT | Performed by: OBSTETRICS & GYNECOLOGY

## 2017-08-16 RX ORDER — OXYCODONE HYDROCHLORIDE AND ACETAMINOPHEN 5; 325 MG/1; MG/1
1 TABLET ORAL EVERY 4 HOURS PRN
Qty: 30 TABLET | Refills: 0 | Status: SHIPPED | OUTPATIENT
Start: 2017-08-16 | End: 2020-09-17

## 2017-08-16 RX ADMIN — OXYCODONE HYDROCHLORIDE AND ACETAMINOPHEN 2 TABLET: 5; 325 TABLET ORAL at 09:44

## 2017-08-16 RX ADMIN — IBUPROFEN 400 MG: 400 TABLET ORAL at 06:00

## 2017-08-16 RX ADMIN — DEXTROSE AND SODIUM CHLORIDE 100 ML/HR: 5; 450 INJECTION, SOLUTION INTRAVENOUS at 04:35

## 2017-08-16 NOTE — PROGRESS NOTES
Continued Stay Note  [facility]     Patient Name: Gaby Mays  MRN: [id number]  Today's Date: 8/16/2017    Admit Date: 8/15/2017          Discharge Plan       08/16/17 0934    Final Note    Final Note 01: Home w/o needs              Discharge Codes     None        Expected Discharge Date and Time     Expected Discharge Date Expected Discharge Time    Aug 16, 2017             Tierra Mota RN

## 2017-08-16 NOTE — PLAN OF CARE
Problem: Patient Care Overview (Adult)  Goal: Plan of Care Review  Outcome: Ongoing (interventions implemented as appropriate)    08/16/17 0520   Coping/Psychosocial Response Interventions   Plan Of Care Reviewed With patient   Patient Care Overview   Progress improving   Outcome Evaluation   Outcome Summary/Follow up Plan VSS, PAIN WELL CONTROLLED BY PO PAIN MED, TOLERATING CLEAR LIQUID DIET WILL ADVANCE TO REGULAR IN AM. WILL REMOVE VAGINAL PACKING IN AM, NO ORDER TO REMOVE F/C, ENCOURAGE TO INCREASE FLUID INTAKE AND TO USE INCENTIVE SPIROMETER, ENCOURAGE TO AMBULATE MORE IN THE HALLWAY.       Goal: Adult Individualization and Mutuality  Outcome: Ongoing (interventions implemented as appropriate)  Goal: Discharge Needs Assessment  Outcome: Ongoing (interventions implemented as appropriate)    Problem: Pain, Acute (Adult)  Goal: Identify Related Risk Factors and Signs and Symptoms  Outcome: Outcome(s) achieved Date Met:  08/16/17  Goal: Acceptable Pain Control/Comfort Level  Outcome: Ongoing (interventions implemented as appropriate)

## 2017-08-16 NOTE — PROGRESS NOTES
DAILY PROGRESS NOTE    Patient Identification:  Name: Gaby Mays  Age: 57 y.o.  Sex: female  :  1960  MRN: [id number]               Subjective:  Interval History: POD#1- TVH  Feeling fine.  Pack removed and Francis out.  Has not voided yet.  Wants  discharged today.     Objective:    Scheduled Meds:   Continuous Infusions:  dextrose 5 % and sodium chloride 0.45 % 100 mL/hr Last Rate: Stopped (17)   Morphine  Last Rate: Stopped (17)     PRN Meds:•  bisacodyl  •  docusate sodium  •  ibuprofen  •  naloxone  •  ondansetron **OR** ondansetron ODT **OR** ondansetron  •  oxyCODONE-acetaminophen    Vital signs in last 24 hours:  Temp:  [96.9 °F (36.1 °C)-98.5 °F (36.9 °C)] 96.9 °F (36.1 °C)  Heart Rate:  [56-84] 57  Resp:  [16] 16  BP: ()/(60-84) 96/62    Intake/Output:    Intake/Output Summary (Last 24 hours) at 17 0905  Last data filed at 17 0859   Gross per 24 hour   Intake             3585 ml   Output             2450 ml   Net             1135 ml       Exam:  General Appearance:    Alert, cooperative, no distress, appears stated age   Lungs:     Clear to auscultation bilaterally, respirations unlabored    Heart:    Regular rate and rhythm, S1 and S2 normal, no murmur, rub   or gallop   Abdomen:     Soft, non-tender, bowel sounds active all four quadrants,     no masses, no organomegaly   Extremities:   Extremities normal, atraumatic, no cyanosis or edema   Skin:   Skin color, texture, turgor normal, no rashes or lesions                            No vaginal bleeding     Data Review:    Lab Results (last 24 hours)     Procedure Component Value Units Date/Time    POC Pregnancy, Urine [[id number]] Collected:  08/15/17 1102    Specimen:  Urine Updated:  08/15/17 1133     HCG, Urine, QL Negative     Lot Number [id number]     Internal Positive Control Positive     Internal Negative Control Negative    Tissue Pathology Exam [[id number]] Collected:  08/15/17 1401    Specimen:   Tissue from Uterus with Cervix Updated:  08/15/17 1443    CBC (No Diff) [[id number]]  (Abnormal) Collected:  08/15/17 2007    Specimen:  Blood Updated:  08/15/17 2034     WBC 12.61 (H) 10*3/mm3      RBC 3.85 (L) 10*6/mm3      Hemoglobin 11.7 (L) g/dL      Hematocrit 37.0 %      MCV 96.1 fL      MCH 30.4 pg      MCHC 31.6 (L) g/dL      RDW 13.5 (H) %      RDW-SD 48.2 fl      MPV 10.3 fL      Platelets 259 10*3/mm3     CBC & Differential [[id number]] Collected:  08/16/17 0634    Specimen:  Blood Updated:  08/16/17 0738    Narrative:       The following orders were created for panel order CBC & Differential.  Procedure                               Abnormality         Status                     ---------                               -----------         ------                     CBC Auto Differential[[id number]]        Abnormal            Final result                 Please view results for these tests on the individual orders.    CBC Auto Differential [[id number]]  (Abnormal) Collected:  08/16/17 0634    Specimen:  Blood Updated:  08/16/17 0738     WBC 12.28 (H) 10*3/mm3      RBC 3.70 (L) 10*6/mm3      Hemoglobin 11.2 (L) g/dL      Hematocrit 35.7 %      MCV 96.5 fL      MCH 30.3 pg      MCHC 31.4 (L) g/dL      RDW 13.6 (H) %      RDW-SD 47.8 fl      MPV 10.2 fL      Platelets 278 10*3/mm3      Neutrophil % 80.0 (H) %      Lymphocyte % 10.3 (L) %      Monocyte % 9.4 %      Eosinophil % 0.0 (L) %      Basophil % 0.1 %      Immature Grans % 0.2 %      Neutrophils, Absolute 9.83 (H) 10*3/mm3      Lymphocytes, Absolute 1.26 10*3/mm3      Monocytes, Absolute 1.15 10*3/mm3      Eosinophils, Absolute 0.00 10*3/mm3      Basophils, Absolute 0.01 10*3/mm3      Immature Grans, Absolute 0.03 10*3/mm3         Assessment:  Active Problems:    Incomplete uterovaginal prolapse    1. POD#1- total vaginal hysterectomy.  Doing well.  Hemoglobin stable.  Very little pain.    Plan:  1. DC on Percocet 5/325. RTO 2 weeks .  Patient to void  prior to discharge.    Jameel Munguia MD  8/16/2017

## 2017-08-30 ENCOUNTER — OFFICE VISIT (OUTPATIENT)
Dept: OBSTETRICS AND GYNECOLOGY | Facility: CLINIC | Age: 57
End: 2017-08-30

## 2017-08-30 VITALS
DIASTOLIC BLOOD PRESSURE: 70 MMHG | SYSTOLIC BLOOD PRESSURE: 122 MMHG | HEIGHT: 70 IN | BODY MASS INDEX: 32.21 KG/M2 | WEIGHT: 225 LBS

## 2017-08-30 DIAGNOSIS — Z09 POSTOP CHECK: Primary | ICD-10-CM

## 2017-08-30 PROCEDURE — 99024 POSTOP FOLLOW-UP VISIT: CPT | Performed by: OBSTETRICS & GYNECOLOGY

## 2017-08-30 NOTE — PROGRESS NOTES
Subjective CC postop visit.  Gaby Mays is a 57 y.o. female.     History of Present Illness  patient here for 2 week follow-up after total vaginal hysterectomy.  Pathology benign.  Patient has had no problems with fever, bleeding, etc. but just some normal postop vaginal discharge.  The following portions of the patient's history were reviewed and updated as appropriate: allergies, current medications, past surgical history and problem list.    Review of Systems  As per HPI   Objective   Physical Exam  Cuff healing well with no evidence of infection and sutures still in place.  Bimanual exam totally negative for tenderness or mass.  Assessment/Plan   Gaby was seen today for post-op follow-up.    Diagnoses and all orders for this visit:    Postop check   Doing well from previous total vaginal hysterectomy.  Return to office 4 weeks for postop check.

## 2017-09-20 ENCOUNTER — OFFICE VISIT (OUTPATIENT)
Dept: OBSTETRICS AND GYNECOLOGY | Facility: CLINIC | Age: 57
End: 2017-09-20

## 2017-09-20 VITALS
WEIGHT: 228.6 LBS | HEIGHT: 70 IN | SYSTOLIC BLOOD PRESSURE: 120 MMHG | BODY MASS INDEX: 32.73 KG/M2 | DIASTOLIC BLOOD PRESSURE: 68 MMHG

## 2017-09-20 DIAGNOSIS — Z09 POSTOP CHECK: Primary | ICD-10-CM

## 2017-09-20 PROCEDURE — 99024 POSTOP FOLLOW-UP VISIT: CPT | Performed by: OBSTETRICS & GYNECOLOGY

## 2017-09-20 NOTE — PROGRESS NOTES
"Subjective    Chief Complaint   Patient presents with   • Post-op Follow-up     PO, TVH  8-15-17, NO PROBLEMS FROM SURG BUT HAVING HOT FLASHES      History of Present Illness    Gaby Mays is a 57 y.o. female who presents for postop TVH visit.  Patient having no problems except for some minor hot flashes and not wanting treatment at this time.  She is only 5 weeks postop    Obstetric History:  OB History      Para Term  AB Living    2 2 2       SAB TAB Ectopic Multiple Live Births                 Menstrual History:     No LMP recorded.       Past Medical History:   Diagnosis Date   • Arthritis    • Prolapsed uterus      History reviewed. No pertinent family history.    The following portions of the patient's history were reviewed and updated as appropriate: allergies, current medications and past surgical history.    Review of Systems  As per history of present illness       Objective   Physical Exam  Cuff is still healing with some granulation tissue which was touched with silver nitrate.  No evidence of infection.  Bimanual exam negative.  /68  Ht 70\" (177.8 cm)  Wt 228 lb 9.6 oz (104 kg)  LMP Comment: HYST  BMI 32.8 kg/m2    Assessment/Plan   Gaby was seen today for post-op follow-up.    Diagnoses and all orders for this visit:    Postop check      Patient will return to office in 3-4 weeks to see if her cuff is totally healed and the granulation tissue is resolved.  She will not exercise till after 6 weeks.               "

## 2017-10-18 ENCOUNTER — OFFICE VISIT (OUTPATIENT)
Dept: OBSTETRICS AND GYNECOLOGY | Facility: CLINIC | Age: 57
End: 2017-10-18

## 2017-10-18 VITALS
HEIGHT: 70 IN | DIASTOLIC BLOOD PRESSURE: 70 MMHG | BODY MASS INDEX: 32.21 KG/M2 | SYSTOLIC BLOOD PRESSURE: 110 MMHG | WEIGHT: 225 LBS

## 2017-10-18 DIAGNOSIS — Z09 POSTOP CHECK: Primary | ICD-10-CM

## 2017-10-18 PROCEDURE — 99024 POSTOP FOLLOW-UP VISIT: CPT | Performed by: OBSTETRICS & GYNECOLOGY

## 2017-10-18 NOTE — PROGRESS NOTES
"Subjective    Chief Complaint   Patient presents with   • Post-op Follow-up      History of Present Illness    Gaby Mays is a 57 y.o. female who presents for Follow-up after hysterectomy.  She had granulation tissue last visit and I wanted to recheck it today.  She is not having any problems.    Obstetric History:  OB History      Para Term  AB Living    2 2 2       SAB TAB Ectopic Multiple Live Births                 Menstrual History:     No LMP recorded.       Past Medical History:   Diagnosis Date   • Arthritis    • Prolapsed uterus      No family history on file.        Review of Systems  As per history of present illness       Objective   Physical Exam  Very tiny spot of granulation tissue touched with silver nitrate.  Bimanual exam negative.  /70  Ht 70\" (177.8 cm)  Wt 225 lb (102 kg)  BMI 32.28 kg/m2    Assessment/Plan   Gaby was seen today for post-op follow-up.    Diagnoses and all orders for this visit:    Postop check      Patient will return for annual exam next year.  She has been advised if she has any postcoital bleeding to come back sooner so I can check to see if any granulation tissue still exists.               "

## 2020-09-17 ENCOUNTER — OFFICE VISIT (OUTPATIENT)
Dept: OBSTETRICS AND GYNECOLOGY | Facility: CLINIC | Age: 60
End: 2020-09-17

## 2020-09-17 VITALS
SYSTOLIC BLOOD PRESSURE: 110 MMHG | WEIGHT: 180 LBS | HEIGHT: 70 IN | BODY MASS INDEX: 25.77 KG/M2 | DIASTOLIC BLOOD PRESSURE: 70 MMHG

## 2020-09-17 DIAGNOSIS — Z12.39 SCREENING FOR BREAST CANCER: ICD-10-CM

## 2020-09-17 DIAGNOSIS — Z01.419 ENCOUNTER FOR GYNECOLOGICAL EXAMINATION WITHOUT ABNORMAL FINDING: Primary | ICD-10-CM

## 2020-09-17 PROCEDURE — 99396 PREV VISIT EST AGE 40-64: CPT | Performed by: OBSTETRICS & GYNECOLOGY

## 2020-09-17 RX ORDER — MULTIVIT WITH MINERALS/LUTEIN
TABLET ORAL
COMMUNITY

## 2020-09-17 NOTE — PROGRESS NOTES
Subjective    Chief Complaint   Patient presents with   • Gynecologic Exam     AE, Colonoscopy       History of Present Illness    Gaby Mays is a 60 y.o. female who presents for annual exam.  Non-smoker.  Previous colonoscopy last year.  Patient had diverticulosis and a couple polyps so repeat plan for 5 years.  Has had a hysterectomy.  Had bilateral hip replacements this past year so wants to wait on a DEXA scan of her back.  No other problems.  Mammogram being scheduled    Obstetric History:  OB History        2    Para   2    Term   2            AB        Living           SAB        TAB        Ectopic        Molar        Multiple        Live Births                   Menstrual History:     No LMP recorded.       Past Medical History:   Diagnosis Date   • Arthritis    • Prolapsed uterus      History reviewed. No pertinent family history.  Social History     Tobacco Use   Smoking Status Never Smoker   Smokeless Tobacco Never Used         The following portions of the patient's history were reviewed and updated as appropriate: allergies, current medications, past family history, past medical history, past social history, past surgical history and problem list.    Review of Systems   Constitutional: Negative.  Negative for fever and unexpected weight change.   HENT: Negative.    Respiratory: Negative for shortness of breath and wheezing.    Cardiovascular: Negative for chest pain, palpitations and leg swelling.   Gastrointestinal: Negative for abdominal pain, anal bleeding and blood in stool.   Genitourinary: Negative for dysuria, pelvic pain, urgency, vaginal bleeding, vaginal discharge and vaginal pain.   Skin: Negative.    Neurological: Negative.    Hematological: Negative.  Negative for adenopathy.   Psychiatric/Behavioral: Negative.  Negative for dysphoric mood. The patient is not nervous/anxious.             Objective   Physical Exam  Vitals signs reviewed.   Constitutional:        "Appearance: She is well-developed.   HENT:      Head: Normocephalic.   Eyes:      Pupils: Pupils are equal, round, and reactive to light.   Neck:      Thyroid: No thyromegaly.      Trachea: No tracheal deviation.   Cardiovascular:      Rate and Rhythm: Normal rate and regular rhythm.      Heart sounds: Normal heart sounds. No murmur.   Pulmonary:      Effort: Pulmonary effort is normal. No respiratory distress.      Breath sounds: Normal breath sounds.   Abdominal:      Palpations: Abdomen is soft. There is no mass.      Tenderness: There is no abdominal tenderness.      Hernia: No hernia is present.   Genitourinary:     Vagina: Normal. No vaginal discharge.      Rectum: Normal.      Comments: External genitalia normal  Lymphadenopathy:      Cervical: No cervical adenopathy.   Skin:     General: Skin is warm and dry.      Findings: No rash.   Neurological:      Mental Status: She is alert and oriented to person, place, and time.   Psychiatric:         Behavior: Behavior normal.         /70   Ht 177.8 cm (70\")   Wt 81.6 kg (180 lb)   BMI 25.83 kg/m²     Assessment/Plan   Gaby was seen today for gynecologic exam.    Diagnoses and all orders for this visit:    Encounter for gynecological examination without abnormal finding  -     IGP,rfx Aptima HPV All Pth    Screening for breast cancer        Mammogram.  Counseled about continuing calcium with vitamin D and possibly getting DEXA scan next year.  Counseled about diet and exercise.             "

## 2020-09-21 LAB
CONV .: NORMAL
CYTOLOGIST CVX/VAG CYTO: NORMAL
CYTOLOGY CVX/VAG DOC CYTO: NORMAL
CYTOLOGY CVX/VAG DOC THIN PREP: NORMAL
DX ICD CODE: NORMAL
HIV 1 & 2 AB SER-IMP: NORMAL
OTHER STN SPEC: NORMAL
STAT OF ADQ CVX/VAG CYTO-IMP: NORMAL

## 2020-09-30 ENCOUNTER — PROCEDURE VISIT (OUTPATIENT)
Dept: OBSTETRICS AND GYNECOLOGY | Facility: CLINIC | Age: 60
End: 2020-09-30

## 2020-09-30 ENCOUNTER — APPOINTMENT (OUTPATIENT)
Dept: WOMENS IMAGING | Facility: HOSPITAL | Age: 60
End: 2020-09-30

## 2020-09-30 DIAGNOSIS — Z12.31 VISIT FOR SCREENING MAMMOGRAM: Primary | ICD-10-CM

## 2020-09-30 PROCEDURE — 77063 BREAST TOMOSYNTHESIS BI: CPT | Performed by: RADIOLOGY

## 2020-09-30 PROCEDURE — 77067 SCR MAMMO BI INCL CAD: CPT | Performed by: RADIOLOGY

## 2021-03-30 ENCOUNTER — BULK ORDERING (OUTPATIENT)
Dept: CASE MANAGEMENT | Facility: OTHER | Age: 61
End: 2021-03-30

## 2021-03-30 DIAGNOSIS — Z23 IMMUNIZATION DUE: ICD-10-CM

## 2022-08-11 ENCOUNTER — APPOINTMENT (OUTPATIENT)
Dept: WOMENS IMAGING | Facility: HOSPITAL | Age: 62
End: 2022-08-11

## 2022-08-11 ENCOUNTER — OFFICE VISIT (OUTPATIENT)
Dept: OBSTETRICS AND GYNECOLOGY | Facility: CLINIC | Age: 62
End: 2022-08-11

## 2022-08-11 ENCOUNTER — PROCEDURE VISIT (OUTPATIENT)
Dept: OBSTETRICS AND GYNECOLOGY | Facility: CLINIC | Age: 62
End: 2022-08-11

## 2022-08-11 VITALS
WEIGHT: 167 LBS | DIASTOLIC BLOOD PRESSURE: 64 MMHG | BODY MASS INDEX: 23.91 KG/M2 | HEIGHT: 70 IN | SYSTOLIC BLOOD PRESSURE: 92 MMHG

## 2022-08-11 DIAGNOSIS — Z12.39 ENCOUNTER FOR BREAST CANCER SCREENING USING NON-MAMMOGRAM MODALITY: ICD-10-CM

## 2022-08-11 DIAGNOSIS — Z12.31 VISIT FOR SCREENING MAMMOGRAM: Primary | ICD-10-CM

## 2022-08-11 DIAGNOSIS — N95.1 MENOPAUSAL STATE: ICD-10-CM

## 2022-08-11 DIAGNOSIS — Z01.419 ENCOUNTER FOR GYNECOLOGICAL EXAMINATION WITHOUT ABNORMAL FINDING: Primary | ICD-10-CM

## 2022-08-11 PROCEDURE — 77063 BREAST TOMOSYNTHESIS BI: CPT | Performed by: RADIOLOGY

## 2022-08-11 PROCEDURE — 99396 PREV VISIT EST AGE 40-64: CPT | Performed by: OBSTETRICS & GYNECOLOGY

## 2022-08-11 PROCEDURE — 77067 SCR MAMMO BI INCL CAD: CPT | Performed by: RADIOLOGY

## 2022-08-11 PROCEDURE — 77063 BREAST TOMOSYNTHESIS BI: CPT | Performed by: OBSTETRICS & GYNECOLOGY

## 2022-08-11 PROCEDURE — 77067 SCR MAMMO BI INCL CAD: CPT | Performed by: OBSTETRICS & GYNECOLOGY

## 2022-08-11 NOTE — PROGRESS NOTES
Subjective    Chief Complaint   Patient presents with   • Gynecologic Exam     AE      History of Present Illness    Gaby Mays is a 62 y.o. female who presents for annual exam.  Non-smoker.  Mammogram today.  Colonoscopy  and due again in 5 years.  Previous hysterectomy.  Discussed getting a DEXA scan and will schedule one.  No current problems.    Obstetric History:  OB History        2    Para   2    Term   2            AB        Living           SAB        IAB        Ectopic        Molar        Multiple        Live Births                   Menstrual History:     No LMP recorded.       Past Medical History:   Diagnosis Date   • Arthritis    • Prolapsed uterus      History reviewed. No pertinent family history.  Social History     Tobacco Use   Smoking Status Never Smoker   Smokeless Tobacco Never Used         The following portions of the patient's history were reviewed and updated as appropriate: allergies, current medications, past family history, past medical history, past social history, past surgical history and problem list.    Review of Systems   Constitutional: Negative.  Negative for fever and unexpected weight change.   HENT: Negative.    Respiratory: Negative for shortness of breath and wheezing.    Cardiovascular: Negative for chest pain, palpitations and leg swelling.   Gastrointestinal: Negative for abdominal pain, anal bleeding and blood in stool.   Genitourinary: Negative for dysuria, pelvic pain, urgency, vaginal bleeding, vaginal discharge and vaginal pain.   Skin: Negative.    Neurological: Negative.    Hematological: Negative.  Negative for adenopathy.   Psychiatric/Behavioral: Negative.  Negative for dysphoric mood. The patient is not nervous/anxious.             Objective   Physical Exam  Vitals reviewed. Exam conducted with a chaperone present.   Constitutional:       Appearance: She is well-developed.   HENT:      Head: Normocephalic.   Eyes:      Pupils: Pupils are  "equal, round, and reactive to light.   Neck:      Thyroid: No thyromegaly.      Trachea: No tracheal deviation.   Cardiovascular:      Rate and Rhythm: Normal rate and regular rhythm.      Heart sounds: Normal heart sounds. No murmur heard.  Pulmonary:      Effort: Pulmonary effort is normal. No respiratory distress.      Breath sounds: Normal breath sounds.   Chest:   Breasts:      Right: Normal. No mass, nipple discharge, tenderness or axillary adenopathy.      Left: Normal. No mass, nipple discharge, tenderness or axillary adenopathy.       Abdominal:      Palpations: Abdomen is soft. There is no mass.      Tenderness: There is no abdominal tenderness.      Hernia: No hernia is present.   Genitourinary:     General: Normal vulva.      Labia:         Right: No tenderness or lesion.         Left: No tenderness or lesion.       Urethra: No prolapse or urethral lesion.      Vagina: Normal. No vaginal discharge.      Uterus: Absent.       Adnexa:         Right: No fullness.          Left: No fullness.        Rectum: Normal. No external hemorrhoid or internal hemorrhoid. Normal anal tone.      Comments: External genitalia normal  Lymphadenopathy:      Cervical: No cervical adenopathy.      Upper Body:      Right upper body: No axillary adenopathy.      Left upper body: No axillary adenopathy.   Skin:     General: Skin is warm and dry.      Findings: No rash.   Neurological:      Mental Status: She is alert and oriented to person, place, and time.   Psychiatric:         Behavior: Behavior normal.         BP 92/64   Ht 177.8 cm (70\")   Wt 75.8 kg (167 lb)   BMI 23.96 kg/m²     Assessment & Plan   Diagnoses and all orders for this visit:    1. Encounter for gynecological examination without abnormal finding (Primary)  -     IGP,rfx Aptima HPV All Pth    2. Encounter for breast cancer screening using non-mammogram modality    3. Menopausal state  -     DEXA Bone Density Axial; Future        Mammogram.  Counseled about " continuing calcium and vitamin D3 for osteoporosis prevention and scheduling a DEXA scan.

## 2022-09-16 ENCOUNTER — TELEPHONE (OUTPATIENT)
Dept: OBSTETRICS AND GYNECOLOGY | Facility: CLINIC | Age: 62
End: 2022-09-16

## 2022-09-16 NOTE — TELEPHONE ENCOUNTER
Caller: Gaby Mays    Relationship: Self    Best call back number: [phone]    What orders are you requesting (i.e. lab or imaging): DEXA SCAN    In what timeframe would the patient need to come in: ASAP    Where will [facility] receive your lab/imaging services: [facility]  FAX #: [phone]

## 2022-10-06 DIAGNOSIS — N95.1 MENOPAUSAL STATE: ICD-10-CM

## 2022-10-11 ENCOUNTER — TELEPHONE (OUTPATIENT)
Dept: OBSTETRICS AND GYNECOLOGY | Facility: CLINIC | Age: 62
End: 2022-10-11

## 2022-10-11 NOTE — TELEPHONE ENCOUNTER
----- Message from Jameel Munguia MD sent at 10/11/2022  1:26 PM EDT -----  Please tell patient that her DEXA scan was totally normal.  Thank you.  ----- Message -----  From: Quentin, Scans Incoming  Sent: 10/6/2022   8:25 AM EDT  To: Jameel Munguia MD

## 2023-08-14 ENCOUNTER — OFFICE VISIT (OUTPATIENT)
Dept: OBSTETRICS AND GYNECOLOGY | Facility: CLINIC | Age: 63
End: 2023-08-14
Payer: COMMERCIAL

## 2023-08-14 ENCOUNTER — PROCEDURE VISIT (OUTPATIENT)
Dept: OBSTETRICS AND GYNECOLOGY | Facility: CLINIC | Age: 63
End: 2023-08-14
Payer: COMMERCIAL

## 2023-08-14 VITALS
SYSTOLIC BLOOD PRESSURE: 98 MMHG | WEIGHT: 166 LBS | BODY MASS INDEX: 23.77 KG/M2 | DIASTOLIC BLOOD PRESSURE: 65 MMHG | HEIGHT: 70 IN

## 2023-08-14 DIAGNOSIS — N81.10 CYSTOCELE WITHOUT UTERINE PROLAPSE: ICD-10-CM

## 2023-08-14 DIAGNOSIS — Z12.31 VISIT FOR SCREENING MAMMOGRAM: Primary | ICD-10-CM

## 2023-08-14 DIAGNOSIS — Z01.411 ENCOUNTER FOR GYNECOLOGICAL EXAMINATION WITH ABNORMAL FINDING: Primary | ICD-10-CM

## 2023-08-14 PROCEDURE — 99396 PREV VISIT EST AGE 40-64: CPT | Performed by: OBSTETRICS & GYNECOLOGY

## 2023-08-14 PROCEDURE — 77063 BREAST TOMOSYNTHESIS BI: CPT | Performed by: OBSTETRICS & GYNECOLOGY

## 2023-08-14 PROCEDURE — 77067 SCR MAMMO BI INCL CAD: CPT | Performed by: OBSTETRICS & GYNECOLOGY

## 2023-08-14 NOTE — PROGRESS NOTES
Subjective    Chief Complaint   Patient presents with    Gynecologic Exam     AE, Mammo today       History of Present Illness    Gaby Mays is a 63 y.o. female who presents for annual exam.  Non-smoker.  Mammogram today.  DEXA scan 2022 normal.  Colonoscopy  and due again after 5 years.  Previous hysterectomy without BSO.  No current problems.    Obstetric History:  OB History          2    Para   2    Term   2            AB        Living             SAB        IAB        Ectopic        Molar        Multiple        Live Births                   Menstrual History:     No LMP recorded.       Past Medical History:   Diagnosis Date    Arthritis     Prolapsed uterus      History reviewed. No pertinent family history.  Social History     Tobacco Use   Smoking Status Never   Smokeless Tobacco Never         The following portions of the patient's history were reviewed and updated as appropriate: allergies, current medications, past family history, past medical history, past social history, past surgical history, and problem list.    Review of Systems   Constitutional: Negative.  Negative for fever and unexpected weight change.   HENT: Negative.     Respiratory:  Negative for shortness of breath and wheezing.    Cardiovascular:  Negative for chest pain, palpitations and leg swelling.   Gastrointestinal:  Negative for abdominal pain, anal bleeding and blood in stool.   Genitourinary:  Negative for dysuria, pelvic pain, urgency, vaginal bleeding, vaginal discharge and vaginal pain.   Skin: Negative.    Neurological: Negative.    Hematological: Negative.  Negative for adenopathy.   Psychiatric/Behavioral: Negative.  Negative for dysphoric mood. The patient is not nervous/anxious.           Objective   Physical Exam  Vitals reviewed. Exam conducted with a chaperone present.   Constitutional:       Appearance: She is well-developed.   HENT:      Head: Normocephalic.   Eyes:      Pupils: Pupils are  "equal, round, and reactive to light.   Neck:      Thyroid: No thyromegaly.      Trachea: No tracheal deviation.   Cardiovascular:      Rate and Rhythm: Normal rate and regular rhythm.      Heart sounds: Normal heart sounds. No murmur heard.  Pulmonary:      Effort: Pulmonary effort is normal. No respiratory distress.      Breath sounds: Normal breath sounds.   Chest:   Breasts:     Right: Normal. No mass, nipple discharge or tenderness.      Left: Normal. No mass, nipple discharge or tenderness.   Abdominal:      Palpations: Abdomen is soft. There is no mass.      Tenderness: There is no abdominal tenderness.      Hernia: No hernia is present.   Genitourinary:     General: Normal vulva.      Labia:         Right: No tenderness or lesion.         Left: No tenderness or lesion.       Urethra: No prolapse or urethral lesion.      Vagina: Normal. No vaginal discharge.      Uterus: Absent.       Adnexa:         Right: No fullness.          Left: No fullness.        Rectum: Normal. No external hemorrhoid or internal hemorrhoid. Normal anal tone.      Comments: External genitalia normal.  Asymptomatic first to second-degree cystocele without stress incontinence  Lymphadenopathy:      Cervical: No cervical adenopathy.      Upper Body:      Right upper body: No axillary adenopathy.      Left upper body: No axillary adenopathy.   Skin:     General: Skin is warm and dry.      Findings: No rash.   Neurological:      Mental Status: She is alert and oriented to person, place, and time.   Psychiatric:         Behavior: Behavior normal.       BP 98/65   Ht 177.8 cm (70\")   Wt 75.3 kg (166 lb)   BMI 23.82 kg/mý     Assessment & Plan   Diagnoses and all orders for this visit:    1. Encounter for gynecological examination with abnormal finding (Primary)  -     IGP,rfx Aptima HPV All Pth    2. Cystocele without uterine prolapse        Mammogram.  Return to office 1 year.  Counseled about continuing calcium with vitamin D for " osteoporosis prevention.  Colorectal screening up-to-date.

## 2024-08-19 ENCOUNTER — PROCEDURE VISIT (OUTPATIENT)
Dept: OBSTETRICS AND GYNECOLOGY | Facility: CLINIC | Age: 64
End: 2024-08-19
Payer: COMMERCIAL

## 2024-08-19 ENCOUNTER — OFFICE VISIT (OUTPATIENT)
Dept: OBSTETRICS AND GYNECOLOGY | Facility: CLINIC | Age: 64
End: 2024-08-19
Payer: COMMERCIAL

## 2024-08-19 VITALS
HEIGHT: 70 IN | WEIGHT: 176 LBS | SYSTOLIC BLOOD PRESSURE: 107 MMHG | DIASTOLIC BLOOD PRESSURE: 60 MMHG | BODY MASS INDEX: 25.2 KG/M2

## 2024-08-19 DIAGNOSIS — Z12.31 VISIT FOR SCREENING MAMMOGRAM: Primary | ICD-10-CM

## 2024-08-19 DIAGNOSIS — Z78.0 POSTMENOPAUSE: ICD-10-CM

## 2024-08-19 DIAGNOSIS — Z01.419 WOMEN'S ANNUAL ROUTINE GYNECOLOGICAL EXAMINATION: Primary | ICD-10-CM

## 2024-08-19 PROCEDURE — 99396 PREV VISIT EST AGE 40-64: CPT | Performed by: NURSE PRACTITIONER

## 2024-08-19 PROCEDURE — 77067 SCR MAMMO BI INCL CAD: CPT | Performed by: NURSE PRACTITIONER

## 2024-08-19 PROCEDURE — 77063 BREAST TOMOSYNTHESIS BI: CPT | Performed by: NURSE PRACTITIONER

## 2024-08-19 NOTE — PROGRESS NOTES
GYN Annual Exam     Chief Complaint   Patient presents with    Gynecologic Exam     Pt here today for AE, mammo today, last pap  CONCETTA Mays is a 64 y.o. female who presents for annual well woman exam. She is postmenopausal. Prior hysterectomy without BSO at age 57. No hx HRT use. She denies vaginal spotting or discharge. She completes SBE monthly. This is my first time meeting Gaby Mays Prior care with Dr. Munguia.     OB History          2    Para   2    Term   2            AB        Living             SAB        IAB        Ectopic        Molar        Multiple        Live Births                  Mammogram: today  Dexa scan:  normal  Colonoscopy:  rpt due  she is planning to schedule with [facility]  Last Pap :  NIL  History of abnormal Pap smear: no  Family history of uterine, colon or ovarian cancer: no  Family history of breast cancer: no  History of abnormal mammogram: no    Menopause:  Bleeding since? no  Vasomotor symptoms: no  HRT: no  Dyspareunia: no      Past Medical History:   Diagnosis Date    Arthritis     Prolapsed uterus        Past Surgical History:   Procedure Laterality Date    HEMORRHOIDECTOMY      HYSTERECTOMY      NE VAGINAL HYSTERECTOMY UTERUS 250 GM/< N/A 8/15/2017    Procedure: VAGINAL HYSTERECTOMY;  Surgeon: Jameel Munguia MD;  [place] [facility] [place] [facility] [place]  Service: Obstetrics/Gynecology    TOTAL HIP ARTHROPLASTY      TUBAL ABDOMINAL LIGATION           Current Outpatient Medications:     ascorbic acid (VITAMIN C) 1000 MG tablet, , Disp: , Rfl:     Calcium Carbonate-Vitamin D (CALCIUM 600+D PO), Take 1 tablet by mouth Every Morning. HOLD PRIOR TO SURG, Disp: , Rfl:     cyclobenzaprine (FLEXERIL) 5 MG tablet, Take 1 tablet by mouth 3 (Three) Times a Day As Needed., Disp: , Rfl:     Omega-3 Fatty Acids (FISH OIL) 1000 MG capsule capsule, Take 1 capsule by mouth Daily With Breakfast. HOLD PRIOR TO SURG, Disp: , Rfl:     diclofenac (VOLTAREN) 1 % gel gel, ,  "Disp: , Rfl:     Allergies   Allergen Reactions    Atorvastatin Other (See Comments)     Annotation - [id number]: elevated lft's    Annotation - [id number]: elevated lft's   <paragraph>Annotation - [id number]: elevated lft's</paragraph>   Annotation - [id number]: elevated lft's       Social History     Tobacco Use    Smoking status: Never    Smokeless tobacco: Never   Vaping Use    Vaping status: Never Used   Substance Use Topics    Alcohol use: No    Drug use: No       History reviewed. No pertinent family history.    Review of Systems   Constitutional:  Negative for chills, fatigue and fever.   Gastrointestinal:  Negative for abdominal distention, abdominal pain, nausea and vomiting.   Endocrine: Negative for cold intolerance and heat intolerance.   Genitourinary:  Negative for dyspareunia, dysuria, menstrual problem, pelvic pain, vaginal bleeding, vaginal discharge and vaginal pain.   Musculoskeletal:  Negative for gait problem.   Skin:  Negative for rash.   Neurological:  Negative for dizziness and headaches.   Hematological:  Does not bruise/bleed easily.   Psychiatric/Behavioral:  Negative for behavioral problems.        /60   Ht 177.8 cm (70\")   Wt 79.8 kg (176 lb)   LMP 07/23/2017   BMI 25.25 kg/m²     Physical Exam  Constitutional:       General: She is not in acute distress.     Appearance: Normal appearance. She is not ill-appearing, toxic-appearing or diaphoretic.   Genitourinary:      Vulva, bladder and urethral meatus normal.      No lesions in the vagina.      Right Labia: No rash, tenderness, lesions, skin changes or Bartholin's cyst.     Left Labia: No tenderness, lesions, skin changes, Bartholin's cyst or rash.     No labial fusion noted.      No inguinal adenopathy present in the right or left side.     Vaginal cuff intact.     No vaginal discharge, erythema, tenderness, bleeding or ulceration.      No vaginal prolapse present.     Mild vaginal atrophy present.       Right Adnexa: not " tender, not full, not palpable, no mass present and not absent.     Left Adnexa: not tender, not full, not palpable, no mass present and not absent.     Cervix is absent.      Uterus is absent.      No urethral tenderness or mass present.      Pelvic exam was performed with patient in the lithotomy position.   Breasts:     Breasts are symmetrical.      Right: Present. No swelling, bleeding, inverted nipple, mass, nipple discharge, skin change, tenderness or breast implant.      Left: Present. No swelling, bleeding, inverted nipple, mass, nipple discharge, skin change, tenderness or breast implant.   HENT:      Head: Normocephalic and atraumatic.   Eyes:      Pupils: Pupils are equal, round, and reactive to light.   Cardiovascular:      Rate and Rhythm: Normal rate.   Pulmonary:      Effort: Pulmonary effort is normal.   Abdominal:      General: There is no distension.      Palpations: Abdomen is soft. There is no mass.      Tenderness: There is no abdominal tenderness. There is no guarding.      Hernia: No hernia is present. There is no hernia in the left inguinal area or right inguinal area.   Musculoskeletal:         General: Normal range of motion.      Cervical back: Normal range of motion and neck supple. No tenderness.   Lymphadenopathy:      Cervical: No cervical adenopathy.      Upper Body:      Right upper body: No supraclavicular, axillary or pectoral adenopathy.      Left upper body: No supraclavicular, axillary or pectoral adenopathy.      Lower Body: No right inguinal adenopathy. No left inguinal adenopathy.   Neurological:      General: No focal deficit present.      Mental Status: She is alert and oriented to person, place, and time.      Cranial Nerves: No cranial nerve deficit.   Skin:     General: Skin is warm and dry.   Psychiatric:         Mood and Affect: Mood normal.         Behavior: Behavior normal.         Thought Content: Thought content normal.         Judgment: Judgment normal.   Vitals  and nursing note reviewed.       Assessment    Diagnoses and all orders for this visit:    1. Women's annual routine gynecological examination (Primary)  -     IGP, Rfx Aptima HPV ASCU    2. Postmenopause       Plan     1) Breast Health - Clinical breast exam & mammogram yearly, Self breast awareness. Schedule screening mammogram.   2) Pap - collected  3) STD-no  4) Smoking status- nonsmoker  5) Colon health - screening colonoscopy recommended if not up to date  6) BMI 25.25  7) Bone health - Weight bearing exercise, dietary calcium recommendations and vitamin D  reviewed.   8) Encouraged 150 minutes of exercise per week if not medically contraindicated    Return in about 1 year (around 8/19/2025) for Annual physical.    Magdalena Plata, APRN  8/19/2024  10:58 EDT

## 2024-08-26 LAB
CONV .: NORMAL
CYTOLOGIST CVX/VAG CYTO: NORMAL
CYTOLOGY CVX/VAG DOC CYTO: NORMAL
CYTOLOGY CVX/VAG DOC THIN PREP: NORMAL
DX ICD CODE: NORMAL
Lab: NORMAL
OTHER STN SPEC: NORMAL
PATHOLOGIST CVX/VAG CYTO: NORMAL
STAT OF ADQ CVX/VAG CYTO-IMP: NORMAL

## (undated) DEVICE — OSC VAGINAL HYSTERECTOMY: Brand: MEDLINE INDUSTRIES, INC.

## (undated) DEVICE — GLV SURG TRIUMPH CLASSIC PF LTX 7.5 STRL

## (undated) DEVICE — ANTIBACTERIAL UNDYED BRAIDED (POLYGLACTIN 910), SYNTHETIC ABSORBABLE SUTURE: Brand: COATED VICRYL

## (undated) DEVICE — VAGINAL PACKING: Brand: DEROYAL

## (undated) DEVICE — SUT VIC 0 CT1 CR8 18IN DYED J740D

## (undated) DEVICE — SYR LL TP 10ML STRL

## (undated) DEVICE — IRRIGATOR BULB ASEPTO 60CC STRL

## (undated) DEVICE — SUT GUT CHRM 2/0 CT1 36IN 923H